# Patient Record
Sex: MALE | Race: BLACK OR AFRICAN AMERICAN
[De-identification: names, ages, dates, MRNs, and addresses within clinical notes are randomized per-mention and may not be internally consistent; named-entity substitution may affect disease eponyms.]

---

## 2019-09-18 ENCOUNTER — HOSPITAL ENCOUNTER (EMERGENCY)
Dept: HOSPITAL 1 - ED | Age: 64
Discharge: ANOTHER HOSPITAL | End: 2019-09-18
Payer: COMMERCIAL

## 2019-09-18 VITALS
HEIGHT: 66 IN | HEIGHT: 66 IN | BODY MASS INDEX: 32.14 KG/M2 | WEIGHT: 200 LBS | WEIGHT: 200 LBS | BODY MASS INDEX: 32.14 KG/M2

## 2019-09-18 VITALS — DIASTOLIC BLOOD PRESSURE: 82 MMHG | SYSTOLIC BLOOD PRESSURE: 140 MMHG

## 2019-09-18 DIAGNOSIS — R31.0: ICD-10-CM

## 2019-09-18 DIAGNOSIS — N23: Primary | ICD-10-CM

## 2019-09-18 DIAGNOSIS — I63.89: ICD-10-CM

## 2019-09-18 DIAGNOSIS — I48.2: ICD-10-CM

## 2019-09-18 DIAGNOSIS — I10: ICD-10-CM

## 2019-09-18 DIAGNOSIS — G89.29: ICD-10-CM

## 2019-09-18 DIAGNOSIS — M54.9: ICD-10-CM

## 2019-09-18 LAB
ALBUMIN SERPL-MCNC: 3.8 G/DL (ref 3.4–5)
ALP SERPL-CCNC: 108 U/L (ref 46–116)
ALT SERPL-CCNC: 64 U/L (ref 16–63)
AST SERPL-CCNC: 43 U/L (ref 15–37)
BASOPHILS NFR BLD: 0.6 % (ref 0–2)
BILIRUB SERPL-MCNC: 2.1 MG/DL (ref 0.2–1)
BUN SERPL-MCNC: 18 MG/DL (ref 7–18)
CALCIUM SERPL-MCNC: 8.8 MG/DL (ref 8.5–10.1)
CHLORIDE SERPL-SCNC: 108 MMOL/L (ref 98–107)
CO2 SERPL-SCNC: 27.1 MMOL/L (ref 21–32)
CREAT SERPL-MCNC: 1.7 MG/DL (ref 0.7–1.3)
ERYTHROCYTE [DISTWIDTH] IN BLOOD BY AUTOMATED COUNT: 17 % (ref 11.5–14.5)
GFR SERPLBLD BASED ON 1.73 SQ M-ARVRAT: 43 ML/MIN
GLUCOSE SERPL-MCNC: 130 MG/DL (ref 74–106)
MICROSCOPIC UR-IMP: YES
PLATELET # BLD: 176 X10^3MCL (ref 130–400)
POTASSIUM SERPL-SCNC: 4 MMOL/L (ref 3.5–5.1)
PROT SERPL-MCNC: 7.1 G/DL (ref 6.4–8.2)
RBC # UR STRIP.AUTO: (no result) /UL
SODIUM SERPL-SCNC: 144 MMOL/L (ref 136–145)
UA SPECIFIC GRAVITY: 1.01 (ref 1–1.03)

## 2019-09-18 NOTE — NUR
NEURO ASSESSMENT DONE BY DR MARCOS. PT ABLE TO ANSWER ALL QUESTIONS
APPRORPIATELY AND FOLLOW ALL COMMANDS. PER PT WIFE PT STILL NOT AT BASELINE.

## 2019-09-18 NOTE — NUR
WHEN ASSESSING PT PAIN, WIFE STATES " HE STARTED TO HALLUCINATE." ASKED PT 
WEHRE HE WAS AND HE SAID " [employer]" WHEN ASKED HIS NAME HE STATES "
DUDE." WHEN ASKED THE DATE HE WAS UNSURE. WHEN ASKED WHO HIS WIFE WAS HE STATES
" THE LADY GIVING ME THE CHICKEN." PT CONFUSED. MD MADE AWARE.

## 2019-09-18 NOTE — NUR
PT CAME TO THE ED TODAY WITH CO L SIDE FLANK PAIN WITH HEMATURIA AND N/V X 4
DAYS. PT WIFE STATES PT HAD 2 STENTS PLACED 6 DAYS AGO AND THEN 4 DAYS AGO HAD
ALL THE SYMPTOMS START. PT WAS SEEN AT [facility] THIS AM FOR THE SYMPTOMS AND
TOLD THAT HE HAS SWELLING TO L KIDNEY BUT NO STONES AND WAS GIVEN MORPHINE AND
ZOFRAN AND SENT HOME. PT WIFE STATES THAT WHEN MEDS WEAR OFF SYMPTOMS START
AGAIN. PT HAS NOTED WEAKNESS AND SLIGHT SLURRING OF SPEECH WHICH IS NORMAL PER
WIFE DUE TO 2 STROKES, ONE IN 2016& THE OTHER IN 2017. PT IS RECLINING ON
[facility] WITH NAD. WIFE AT [facility]. PT HOOKED UP TO FULL MONITORS, WILL CONTINUE
TO MONITOR.

## 2019-09-18 NOTE — NUR
PT BACK FROM CT SCAN. PT HOOKED BACK UP TO MONITORS, IN FULL VIEW OF NURSING
STATION. WILL CONTINUE TO MONITOR.